# Patient Record
(demographics unavailable — no encounter records)

---

## 2024-10-22 NOTE — END OF VISIT
[] : Resident [FreeTextEntry3] : Pt evaluated and counseled by me with the resident. In brief, pt is a 55 yo with long h/o endometriosis/chronic pelvic pain (sp ROMA-BS L oophorecromy in 2017) presenting for refill of HRT which she feels has helped both with vasomotor sx and pelvic pain. We discussed that given her cardiovascular risk factors (tobacco use, HLD), I recommend against continuing HRT. Offered pt nonhormonal options for vasomotor symptoms which she declined. Offered referral to pelvic pain clinic which pt also declined. Discussed option for pt to see a [facility] certified provider and information provided for [facility] provider database. Pt verbalized understanding of plan, all questions answered.

## 2024-10-22 NOTE — END OF VISIT
[] : Resident [FreeTextEntry3] : Pt evaluated and counseled by me with the resident. In brief, pt is a 53 yo with long h/o endometriosis/chronic pelvic pain (sp ROMA-BS L oophorecromy in 2017) presenting for refill of HRT which she feels has helped both with vasomotor sx and pelvic pain. We discussed that given her cardiovascular risk factors (tobacco use, HLD), I recommend against continuing HRT. Offered pt nonhormonal options for vasomotor symptoms which she declined. Offered referral to pelvic pain clinic which pt also declined. Discussed option for pt to see a [facility] certified provider and information provided for [facility] provider database. Pt verbalized understanding of plan, all questions answered.

## 2024-10-22 NOTE — HISTORY OF PRESENT ILLNESS
[FreeTextEntry1] : Patient presents for a follow up visit on HRT. She was started on Prempro at the end of May by Dr. Luke. She called the office on Sep 9th and requested a refill. We asked her to schedule an appointment for further discussion regarding HRT. The patient reports tobacco use and noted to have hyperlipidemia.  The patient says that she used to use estrogen cream on her joints. When she stopped that she started to have severe pelvic pain which she attributed to her hx of endometriosis. She says that the pain was very bad and interrupted her daily life. She also had hot flashes. She is being f/u by endocrinologist that prescribed her Veozah which helped with the hot flashes but not with the pelvic pain. She took it for a month, until Dr. Luke prescribed her Prempro and then she stopped the Veozah. She says that the HRT reduced her pelvic pain by 80 % and that "saved her life". She reports she has seen multiple ObGyns, pain doctors and none of has been able to help her. She is not willing to consider any other alternative.

## 2024-10-22 NOTE — DISCUSSION/SUMMARY
[FreeTextEntry1] : We discussed with the patient regarding taking HRT while smoking and with high cholesterol and the increased risk of developing a clot, have a stroke or cardiac event. Patient expressed understanding, however she is not concerned from those risk and would like to continue to take the medication as it is the only thing that helps with her pain. In the presence of the attending in the room, we explained that we don't feel comfortable prescribing it to her due to the mentioned risks and maybe she should see a menopausal specialist. We provided her with [employer] for menopausal providers and online information regarding menopause. Patient is understanding.  She has scheduled mammogram for next week.  RTC for an annual .

## 2024-12-20 NOTE — HISTORY OF PRESENT ILLNESS
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  Patient reports she's had a headache x3 months (every other day, initially began with less intensity). A week before thanksgiving, she went to see her PCP for her worsening chronic rhinitis and was given antibiotics. Headaches got worse and was prescribed Medrol Pack (took from Thursday-20mg, Friday-20mg, Saturday-Tuesday 10mg) Tuesday night, her headache became a lot worse, she described it as worse she's ever had like "my head is about to explode" She went to [facility] ED. She was prescribed Reglan 10mg that helps subside the pain but does not fully go away. She denies light or noise sensitivity, nausea, vomiting, dizziness or any visual complaints. Of note, she has a family hx of migraines (brother and mother). She also is on a low dose HTR (prempro) for menopausal symptoms. She reports someday cigarette smoker, social drinker and denies recreational drug use. She has used OTC Tylenol, Ibuprofen and Leve for her headaches, only Aleve helps.   Incidentally, she was found to have short segment of high-grade stenosis/near occlusion involving a short segment of the proximal left vertebral artery. She denies HTN or HLD, although labs from 7/2024 showed , not on ASA or statin. (both parents have HTN and HLD, denies MI, strokes or blood clots history). BP today is 123/86. She has never been tested for AKIL but denies snoring.   Imaging: CT HEAD: No evidence of acute intracranial pathology.  CTA NECK: Short segment of high-grade stenosis/near occlusion involving a short segment of the proximal left vertebral artery, (V1 segment), best visualized on (series 602 and 34). The artery resumes normal caliber just distal to this region and remains patent and normal in caliber throughout its remaining course.  Remaining arterial vessels are normal in caliber without evidence of occlusion or high-grade stenosis.  CTA HEAD: No large vessel occlusion, significant stenosis or vascular abnormality identified.

## 2024-12-20 NOTE — HISTORY OF PRESENT ILLNESS
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  Patient reports she's had a headache x3 months (every other day, initially began with less intensity). A week before thanksgiving, she went to see her PCP for her worsening chronic rhinitis and was given antibiotics. Headaches got worse and was prescribed Medrol Pack (took from Thursday-20mg, Friday-20mg, Saturday-Tuesday 10mg) Tuesday night, her headache became a lot worse, she described it as worse she's ever had like "my head is about to explode" She went to [facility] ED. She was prescribed Reglan 10mg that helps subside the pain but does not fully go away. She denies light or noise sensitivity, nausea, vomiting, dizziness or any visual complaints. Of note, she has a family hx of migraines (brother and mother). She also is on a low dose HTR (prempro) for menopausal symptoms. She reports someday cigarette smoker, social drinker and denies recreational drug use. She has used OTC Tylenol, Ibuprofen and Leve for her headaches, only Aleve helps.   Incidentally, she was found to have short segment of high-grade stenosis/near occlusion involving a short segment of the proximal left vertebral artery. She denies HTN or HLD, although labs from 7/2024 showed , not on ASA or statin. (both parents have HTN and HLD, denies MI, strokes or blood clots history). BP today is 123/86. She has never been tested for AKIL but denies snoring.   Imaging: CT HEAD: No evidence of acute intracranial pathology.  CTA NECK: Short segment of high-grade stenosis/near occlusion involving a short segment of the proximal left vertebral artery, (V1 segment), best visualized on (series 602 and 34). The artery resumes normal caliber just distal to this region and remains patent and normal in caliber throughout its remaining course.  Remaining arterial vessels are normal in caliber without evidence of occlusion or high-grade stenosis.  CTA HEAD: No large vessel occlusion, significant stenosis or vascular abnormality identified.

## 2024-12-20 NOTE — ASSESSMENT
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  Will need to evaluate for possible cause of headaches- r/o dissection and SVT (on HRT, someday smoker and hx of sinus problems).  Plan: -Start ASA today -Pt will prob need to be on a statin, will wait for lab results -Check labs today: hgba1c, lipid panel, lipo A, -ESR elevated in ED< will recheck -MRA and MRV H/N w/wo IV contrast eval vessel disease, r/o svt and r/o structural abnormality causing headaches -Recommend starting Magnesium Glycinate 400mg daily for prophylaxis to see if HA improves -Start headache journal to identify frequency, response to medications and triggers -Encouraged healthy lifestyle habits including regular exercise, goal of 8 hours of sleep, adequate hydration and stress management -RTC in 3 months

## 2025-04-02 NOTE — ASSESSMENT
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  Overall, patient neurologically stable. No headaches since sinus were managed and controlled. Her biggest problem is the intolerance to statins, her last LDL was normal, she also took herself of ASA. Again, patient was counselled and educated today regarding ASA use and controlling LDL to prevent worsening of the L VA stenosis.  Plan: -Restart ASA 81mg daily -Repeat bloodwork end on June, if LDL high can try Zetia, she is intolerant of statins -Encouraged healthy lifestyle habits including regular exercise, goal of 8 hours of sleep, adequate hydration and stress management -RTC in 3 months

## 2025-04-02 NOTE — HISTORY OF PRESENT ILLNESS
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  Patient was recommended ASA and statin from first visit. She stopped ASA as she did not understand why she needed to take it. She could not tolerate Atorvastatin d/t reported weight gain of 15lbs. She also felt it was affecting her menopause. She was switched to Rosuvastatin but also stopped d/t lower back pain and vaginal bleeding. Her BP is 120/83. She has started doing intense cardio exercise 2-3x a week. She had changed her diet drastically and eat very healthy.   She denies further headaches since her sinus issues were managed.

## 2025-04-10 NOTE — ASSESSMENT
[FreeTextEntry1] : Reviewed at length with patient exam history and imaging consistent with cervical origin she elects home exercise PT medication and moist heat if this no improvement consideration MRI of cervical spine

## 2025-04-10 NOTE — PHYSICAL EXAM
[de-identified] : Left Shoulder: Constitutional: The patient is healthy-appearing and in no apparent distress.   Cardiovascular System:  The capillary refill is less than 2 seconds.   Skin:  There are no skin abnormalities.  C-Spine/Neck:  Active Range of Motion: Flexion				50 Extension			60 Lateral rotation			80    Left Shoulder:  Inspection:  There is no atrophy, erythema, warmth, swelling. There is no scapular winging. There is no AC prominence.   Bony Palpation:  There is no tenderness of the clavicle. There is no tenderness of the acromioclavicular joint. There is no tenderness of the greater tuberosity.  There is no tenderness of the bicipital groove.   Soft Tissue Palpation:  There is tenderness of the trapezius. There is tenderness of the rhomboid. There is no tenderness of the subacromial bursa.   Active Range of Motion:  Forward flexion- 				180  Abduction-					150 External rotation at 0 degrees abduction-	80  Internal rotation at 0 degrees abduction-	80  Passive Range of Motion:  Forward flexion- 			180  Abduction-				150 External rotation at 0 deg abduction-	80  Internal rotation at 0 deg abduction-	80  Special Tests:  Hawkin's  				Negative  Neer's  				Negative Speed's  				Negative AC cross-over 			            Negative [place]'s  				Negative  Stability:  There is no general laxity.   Psychiatric:  The patient demonstrates a normal mood and affect and is active and alert  [de-identified] : Based on detailed discussion with history and physical examination of the patient, x-ray evaluation is recommended and ordered for treatment and diagnosis. 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